# Patient Record
Sex: FEMALE | Race: OTHER | NOT HISPANIC OR LATINO | URBAN - METROPOLITAN AREA
[De-identification: names, ages, dates, MRNs, and addresses within clinical notes are randomized per-mention and may not be internally consistent; named-entity substitution may affect disease eponyms.]

---

## 2023-04-15 ENCOUNTER — EMERGENCY (EMERGENCY)
Facility: HOSPITAL | Age: 83
LOS: 1 days | Discharge: HOME | End: 2023-04-15
Attending: STUDENT IN AN ORGANIZED HEALTH CARE EDUCATION/TRAINING PROGRAM
Payer: MEDICARE

## 2023-04-15 VITALS
WEIGHT: 177.91 LBS | DIASTOLIC BLOOD PRESSURE: 61 MMHG | SYSTOLIC BLOOD PRESSURE: 160 MMHG | TEMPERATURE: 98 F | OXYGEN SATURATION: 98 % | RESPIRATION RATE: 68 BRPM

## 2023-04-15 VITALS — RESPIRATION RATE: 18 BRPM

## 2023-04-15 PROCEDURE — 99284 EMERGENCY DEPT VISIT MOD MDM: CPT

## 2023-04-15 PROCEDURE — 96372 THER/PROPH/DIAG INJ SC/IM: CPT

## 2023-04-15 PROCEDURE — 99283 EMERGENCY DEPT VISIT LOW MDM: CPT | Mod: 25

## 2023-04-15 RX ORDER — LIDOCAINE 4 G/100G
1 CREAM TOPICAL ONCE
Refills: 0 | Status: COMPLETED | OUTPATIENT
Start: 2023-04-15 | End: 2023-04-15

## 2023-04-15 RX ORDER — OXYCODONE AND ACETAMINOPHEN 5; 325 MG/1; MG/1
1 TABLET ORAL
Qty: 15 | Refills: 0
Start: 2023-04-15

## 2023-04-15 RX ORDER — KETOROLAC TROMETHAMINE 30 MG/ML
60 SYRINGE (ML) INJECTION ONCE
Refills: 0 | Status: DISCONTINUED | OUTPATIENT
Start: 2023-04-15 | End: 2023-04-15

## 2023-04-15 RX ADMIN — Medication 60 MILLIGRAM(S): at 12:36

## 2023-04-15 RX ADMIN — Medication 60 MILLIGRAM(S): at 13:06

## 2023-04-15 RX ADMIN — LIDOCAINE 1 PATCH: 4 CREAM TOPICAL at 12:37

## 2023-04-15 NOTE — ED PROVIDER NOTE - CLINICAL SUMMARY MEDICAL DECISION MAKING FREE TEXT BOX
Patient presenting with joint pain. had ct and xray showing effusion per patient paper. low suspicion for septic joint given afebrile, rom intact. patient able to ambulate with her walk (baseline). pain likely 2/2 arthritic change. no new injury or trauma, no indication for repeat imaging at this time. Will treat pain, assess for clinical improvement. ed obs and reassess

## 2023-04-15 NOTE — ED PROVIDER NOTE - PATIENT PORTAL LINK FT
You can access the FollowMyHealth Patient Portal offered by [facility] by registering at the following website: [email or web address]. By joining [facility]’s FollowMyHealth portal, you will also be able to view your health information using other applications (apps) compatible with our system.

## 2023-04-15 NOTE — ED PROVIDER NOTE - PROGRESS NOTE DETAILS
Patient pain improved. given med, return precaution and instructed to f.u pmd and ortho. clinically stable on dc

## 2023-04-15 NOTE — ED PROVIDER NOTE - CPE EDP RESP NORM
HISTORY OF PRESENT ILLNESS  Ms. Mccullough is a pleasant 63 year old year old female who presents to clinic today with the following:  What problem are you here for? Cervical radiculopathy  F/U FOR CERVICAL INJECTION DOING BETTER  ALSO C/O MORE LOW BACK PAIN WITH RADICULAR SYMPTOMS INTO BOTH LEGS  HAD ANASTASIA IN LUMBAR SPINE 9 MONTHS AGO, THIS IMPROVED HER PAIN AND SYMPTOMS OVER 80% UNTIL LAST MONTH  WANTS TO DISCUSS ANOTHER TREATMENT  HAS NOT HAD LUMBAR MRI SINCE 11/2021  How long have you had this problem? yes    Have you had any recent imaging of this problem? Xrays/MRI/CT scans? yes    Have you had treatments for this problem in the past?  -Medications? yes  -Physical therapy? yes  -Injections? yes  -Surgery? no    How severe is this problem today? 0-10 scale? 1    How severe has this problem been at WORST in the past? 0-10 scale? 7    What do you think caused this problem? aging    Does this problem or its symptoms cause difficulty for you falling asleep or staying asleep? Not currently    Anything else you want us to know about this problem? no          MEDICAL HISTORY  Patient Active Problem List   Diagnosis     Generalized osteoarthrosis, involving multiple sites     CRPS (complex regional pain syndrome), lower limb     Fibromyalgia     Somatoform disorder     Histrionic personality (H)     Encounter for long-term use of opiate analgesic     Knee joint replacement by other means     Hypothyroidism     Insomnia, unspecified type     Hyperlipidemia     Hashimoto's thyroiditis     Glucocorticoid deficiency (H)     Posttraumatic stress disorder     Impingement syndrome of left shoulder     Abdominal cramping, generalized     Intermittent diarrhea     Primary osteoarthritis involving multiple joints     Attention deficit disorder     Allergic rhinitis     Cushing's syndrome (H)     Endometriosis     Essential hypertension     Systemic lupus erythematosus (H)     S/P cervical spinal fusion     Paroxysmal atrial  fibrillation (H)     Nonischemic cardiomyopathy (H)     Personal history of DVT (deep vein thrombosis)     History of pulmonary embolism     Acute deep vein thrombosis (DVT) of popliteal vein of right lower extremity (H)     Acute pulmonary embolism (H)     Adrenal cortical steroids causing adverse effect in therapeutic use     Alopecia areata     Anemia, blood loss     Ankle pain, left     ARF (acute renal failure) (H)     Arthritis, septic (H)     Chronic ethmoidal sinusitis     Chronic maxillary sinusitis     Deviated nasal septum     Complications due to internal joint prosthesis (H)     Generalized pain     Iatrogenic hyperthyroidism     S/P TKR (total knee replacement)     Left shoulder pain     Lipoma of skin and subcutaneous tissue     Malabsorption     Nasal fracture     Nasal turbinate hypertrophy     Opioid type dependence (H)     Other specified abnormal findings of blood chemistry     Other acute postoperative pain     Pain in joint, lower leg     Postoperative hypotension     S/P total knee replacement     Thrombus of right atrial appendage without antecedent myocardial infarction     Chronic pain of both shoulders     GI bleed     Grade III internal hemorrhoids     Atrial fibrillation with RVR (H)     Acute deep vein thrombosis (DVT) of proximal vein of right lower extremity (H)     Pneumonia of left upper lobe due to infectious organism     Morbid obesity (H)     Cerebrovascular accident (CVA), unspecified mechanism (H)     CVA (cerebral vascular accident) (H)     Neuropathy of left suprascapular nerve     Neuropathy of right suprascapular nerve     Stage 3 right buttock     BMI 40.0-44.9, adult (H)     Chronic anticoagulation     Controlled substance agreement signed     Craniofacial hyperhidrosis     Current chronic use of systemic steroids     DNR (do not resuscitate)     Hypertonic bladder     Hypo-osmolality and hyponatremia     Lumbosacral radiculopathy at S1     Overflow diarrhea     Refusal  of blood transfusions as patient is Yazidism     Unspecified fall, initial encounter     Unspecified injury of shoulder and upper arm, unspecified arm, initial encounter     Weakness     Chronic pain disorder     Constipation     Hemoptysis     COVID-19 virus infection     Acute respiratory failure with hypoxia (H)     Edema, unspecified type     History of ischemic stroke     Personal history of COVID-19     Pressure injury of sacral region, unstageable (H)     Pneumonia due to infectious organism, unspecified laterality, unspecified part of lung     RSV (respiratory syncytial virus infection)       Current Outpatient Medications   Medication Sig Dispense Refill     albuterol (PROAIR HFA/PROVENTIL HFA/VENTOLIN HFA) 108 (90 Base) MCG/ACT inhaler Inhale 1-2 puffs into the lungs every 6 hours as needed for shortness of breath, wheezing or cough       atorvastatin (LIPITOR) 40 MG tablet Take 1 tablet (40 mg) by mouth every evening 90 tablet 2     azithromycin (ZITHROMAX) 250 MG tablet Take 1 tablet (250 mg) by mouth daily 4 tablet 0     benzonatate (TESSALON) 200 MG capsule Take 1 capsule (200 mg) by mouth 3 times daily as needed for cough 20 capsule 0     carboxymethylcellulose (REFRESH PLUS) 0.5 % SOLN ophthalmic solution Place 1 drop into both eyes 2 times daily as needed  1 Bottle      cefuroxime (CEFTIN) 500 MG tablet Take 1 tablet (500 mg) by mouth 2 times daily 10 tablet 0     cyanocobalamin 1000 MCG/ML injection Inject 1 mL (1,000 mcg) Subcutaneous every 7 days 4 mL 5     cyclobenzaprine (FLEXERIL) 10 MG tablet Take 1 tablet (10 mg) by mouth 3 times daily 90 tablet 3     cycloSPORINE (RESTASIS) 0.05 % ophthalmic emulsion Place 1 drop into both eyes 2 times daily as needed        diltiazem ER COATED BEADS (CARDIZEM CD/CARTIA XT) 180 MG 24 hr capsule Take 180 mg by mouth 2 times daily       Elastic Bandages & Supports (MEDICAL COMPRESSION SOCKS) MISC 1 Package daily Please measure and distribute 2 pair  of 20mmHg - 30mmHg THIGH high open or closed toe compression stockings with extra refills as indicated. Jobst ultrasheer or equivalent. 2 each 3     fexofenadine (ALLEGRA) 180 MG tablet Take 180 mg by mouth daily as needed        furosemide (LASIX) 20 MG tablet Take 1 tablet (20 mg) by mouth every evening (Patient taking differently: Take 20 mg by mouth daily) 60 tablet 3     furosemide (LASIX) 40 MG tablet Take 1 tablet (40 mg) by mouth every morning Future refills by PCP Dr. Arcadio Farfan with phone number [phone]. 60 tablet 3     gabapentin (NEURONTIN) 300 MG capsule Take 300 mg by mouth 2 times daily (morning and afternoon) with 800mg tablet (total dose 1100mg)       gabapentin (NEURONTIN) 800 MG tablet Take 800 mg by mouth At Bedtime        gabapentin (NEURONTIN) 800 MG tablet Take 800 mg by mouth 2 times daily (morning and afternoon) in addition to 300mg capsule (total dose 1100mg)       guaiFENesin-dextromethorphan (ROBITUSSIN DM) 100-10 MG/5ML syrup Take 10 mLs by mouth 4 times daily as needed for cough 236 mL 0     HYDROmorphone (DILAUDID) 8 MG tablet Take 8 mg by mouth 3 times daily . Max of 3 doses per day.       levothyroxine (TIROSINT) 100 MCG capsule Take 300 mcg by mouth daily       lifitegrast (XIIDRA) 5 % opthalmic solution Place 1 drop into both eyes 2 times daily       liothyronine (CYTOMEL) 5 MCG tablet Take 5 mcg by mouth daily       metoprolol succinate ER (TOPROL XL) 25 MG 24 hr tablet Take 3 tablets (75 mg) by mouth 2 times daily 540 tablet 2     morphine (MS CONTIN) 30 MG 12 hr tablet Take 30 mg by mouth 2 times daily (morning and night) in addition to 60 mg tablet for a total dose of 90mg. 270 tablet 0     morphine (MS CONTIN) 60 MG 12 hr tablet Take 60 mg by mouth 3 times daily (morning and night) in addition to 30mg tablet for a total dose of 90mg 30 tablet 0     naloxone (NARCAN) 4 MG/0.1ML nasal spray Spray 4 mg into one nostril alternating nostrils as needed for opioid reversal  every 2-3 minutes until assistance arrives       prednisoLONE acetate (PRED FORTE) 1 % ophthalmic susp Place 1 drop into both eyes 2 times daily        rivaroxaban ANTICOAGULANT (XARELTO) 20 MG TABS tablet Take 1 tablet (20 mg) by mouth daily (with dinner)       sodium fluoride (SF 5000 PLUS) 1.1 % CREA        zolpidem (AMBIEN) 5 MG tablet Take 5 mg by mouth nightly as needed for sleep         Allergies   Allergen Reactions     Blood Transfusion Related (Informational Only) Other (See Comments)     PT IS [facility]EHOVA[employer] WITNESS AND REFUSES ALL BLOOD PRODUCTS.      Chlorhexidine Hives     Thor surgical cleanser     Codeine Hives     Has tolerated Oxycodone in past      Ibuprofen [Nsaids] Hives     Penicillins Hives     Other reaction(s): Unknown     Sulfa Drugs Hives     Other reaction(s): Unknown     Calcium Channel Blockers      Doxycycline GI Disturbance     Emesis & diarrhea  Patient denies allergy to this med     Hydroxyzine      rash     Mold        Family History   Problem Relation Age of Onset     Hypertension Mother      No Known Problems Father      No Known Problems Sister      No Known Problems Brother      No Known Problems Maternal Grandmother      No Known Problems Maternal Grandfather      No Known Problems Paternal Grandmother      No Known Problems Other      Social History     Socioeconomic History     Marital status:    Tobacco Use     Smoking status: Former     Packs/day: 1.50     Years: 20.00     Pack years: 30.00     Types: Cigarettes     Start date: 1973     Quit date: 1993     Years since quittin.2     Smokeless tobacco: Never     Tobacco comments:     wish I never started   Substance and Sexual Activity     Alcohol use: No     Alcohol/week: 0.0 standard drinks     Drug use: No     Sexual activity: Not Currently     Partners: Male     Birth control/protection: Post-menopausal   Social History Narrative    ** Merged History Encounter **            Additional medical/Social/Surgical  histories reviewed in EPIC and updated as appropriate.     REVIEW OF SYSTEMS (3/30/2023)  10 point ROS of systems including Constitutional, Eyes, Respiratory, Cardiovascular, Gastroenterology, Genitourinary, Integumentary, Musculoskeletal, Psychiatric, Allergic/Immunologic were all negative except for pertinent positives noted in my HPI.     PHYSICAL EXAM  VSS      General  - normal appearance, in no obvious distress  HEENT  - conjunctivae not injected, moist mucous membranes, normocephalic/atraumatic head, ears normal appearance, no lesions, mouth normal appearance, no scars, normal dentition and teeth present  CV  - normal peripheral perfusion  Pulm  - normal respiratory pattern, non-labored  Musculoskeletal - lumbar spine  - stance: normal gait without limp, no obvious leg length discrepancy, normal heel and toe walk  - inspection: normal bone and joint alignment, no obvious scoliosis  - palpation: no paravertebral or bony tenderness  - ROM: flexion exacerbates pain, normal extension, sidebending, rotation  - strength: lower extremities 5/5 in all planes  - special tests:  (+) straight leg raise  (+) slump test  Neuro  - patellar and Achilles DTRs 2+ bilaterally, some bilateral lower extremity sensory deficit throughout L5 distribution, grossly normal coordination, normal muscle tone  Skin  - no ecchymosis, erythema, warmth, or induration, no obvious rash  Psych  - interactive, appropriate, normal mood and affect  NecK: improved ROM, no pain today, negative spurlings  ASSESSMENT & PLAN  64 yo female with cervical ddd, disc herniations, radicular pain, improved, lumbar ddd, disc herniations, previous lumbar fusion, worsened symptoms    I independently reviewed the following imaging studies:  Previous lumbar MRI: from 2021: shows ddd, disc herniations  Discussed and ordered lumbar MRI with plan to order ANASTASIA   F/u after MRI by phone or charting    Patient has been doing home exercise physical therapy program for  this problem      Appropriate PPE was utilized for prevention of spread of Covid-19.  Shon Simpson MD, CAM       normal...

## 2023-04-15 NOTE — ED PROVIDER NOTE - OBJECTIVE STATEMENT
83 y/o w/ pmh of arthritis presenting with right hip pain. recently discharge from [facility] for uti. state she had ct and xray for hips, negative for fracture but showed fluid. patient discharge paper from [place] state mild effusion in joint likely 2/2 OA, low suspicion for septic joint. patient endorses that she is able to walk with walker. denies fever, n, v, weakness. endorses that she has a ortho she follows near her home.